# Patient Record
Sex: FEMALE | Race: WHITE | NOT HISPANIC OR LATINO | URBAN - METROPOLITAN AREA
[De-identification: names, ages, dates, MRNs, and addresses within clinical notes are randomized per-mention and may not be internally consistent; named-entity substitution may affect disease eponyms.]

---

## 2017-06-03 ENCOUNTER — INPATIENT (INPATIENT)
Facility: HOSPITAL | Age: 47
LOS: 2 days | Discharge: HOME | End: 2017-06-06
Attending: FAMILY MEDICINE | Admitting: FAMILY MEDICINE
Payer: COMMERCIAL

## 2017-06-03 VITALS — WEIGHT: 149.91 LBS | HEIGHT: 62 IN

## 2017-06-03 DIAGNOSIS — R10.11 RIGHT UPPER QUADRANT PAIN: ICD-10-CM

## 2017-06-03 LAB
ALBUMIN SERPL ELPH-MCNC: 4.1 G/DL — SIGNIFICANT CHANGE UP (ref 3.3–5)
ALP SERPL-CCNC: 108 U/L — SIGNIFICANT CHANGE UP (ref 40–120)
ALT FLD-CCNC: 38 U/L — SIGNIFICANT CHANGE UP (ref 12–78)
ANION GAP SERPL CALC-SCNC: 9 MMOL/L — SIGNIFICANT CHANGE UP (ref 5–17)
AST SERPL-CCNC: 20 U/L — SIGNIFICANT CHANGE UP (ref 15–37)
BASOPHILS # BLD AUTO: 0 K/UL — SIGNIFICANT CHANGE UP (ref 0–0.2)
BASOPHILS NFR BLD AUTO: 0.3 % — SIGNIFICANT CHANGE UP (ref 0–2)
BILIRUB SERPL-MCNC: 0.6 MG/DL — SIGNIFICANT CHANGE UP (ref 0.2–1.2)
BUN SERPL-MCNC: 8 MG/DL — SIGNIFICANT CHANGE UP (ref 7–23)
CALCIUM SERPL-MCNC: 9.6 MG/DL — SIGNIFICANT CHANGE UP (ref 8.5–10.1)
CHLORIDE SERPL-SCNC: 101 MMOL/L — SIGNIFICANT CHANGE UP (ref 96–108)
CK SERPL-CCNC: 96 U/L — SIGNIFICANT CHANGE UP (ref 26–192)
CO2 SERPL-SCNC: 25 MMOL/L — SIGNIFICANT CHANGE UP (ref 22–31)
CREAT SERPL-MCNC: 0.63 MG/DL — SIGNIFICANT CHANGE UP (ref 0.5–1.3)
EOSINOPHIL # BLD AUTO: 0.1 K/UL — SIGNIFICANT CHANGE UP (ref 0–0.5)
EOSINOPHIL NFR BLD AUTO: 0.5 % — SIGNIFICANT CHANGE UP (ref 0–6)
GLUCOSE SERPL-MCNC: 134 MG/DL — HIGH (ref 70–99)
HCG SERPL-ACNC: 1 MIU/ML — SIGNIFICANT CHANGE UP
HCT VFR BLD CALC: 43 % — SIGNIFICANT CHANGE UP (ref 34.5–45)
HGB BLD-MCNC: 15 G/DL — SIGNIFICANT CHANGE UP (ref 11.5–15.5)
LIDOCAIN IGE QN: 90 U/L — SIGNIFICANT CHANGE UP (ref 73–393)
LYMPHOCYTES # BLD AUTO: 1.4 K/UL — SIGNIFICANT CHANGE UP (ref 1–3.3)
LYMPHOCYTES # BLD AUTO: 9.6 % — LOW (ref 13–44)
MCHC RBC-ENTMCNC: 32.2 PG — SIGNIFICANT CHANGE UP (ref 27–34)
MCHC RBC-ENTMCNC: 34.7 GM/DL — SIGNIFICANT CHANGE UP (ref 32–36)
MCV RBC AUTO: 92.8 FL — SIGNIFICANT CHANGE UP (ref 80–100)
MONOCYTES # BLD AUTO: 0.6 K/UL — SIGNIFICANT CHANGE UP (ref 0–0.9)
MONOCYTES NFR BLD AUTO: 4 % — SIGNIFICANT CHANGE UP (ref 2–14)
NEUTROPHILS # BLD AUTO: 12 K/UL — HIGH (ref 1.8–7.4)
NEUTROPHILS NFR BLD AUTO: 85.6 % — HIGH (ref 43–77)
PLATELET # BLD AUTO: 328 K/UL — SIGNIFICANT CHANGE UP (ref 150–400)
POTASSIUM SERPL-MCNC: 3.9 MMOL/L — SIGNIFICANT CHANGE UP (ref 3.5–5.3)
POTASSIUM SERPL-SCNC: 3.9 MMOL/L — SIGNIFICANT CHANGE UP (ref 3.5–5.3)
PROT SERPL-MCNC: 7.4 GM/DL — SIGNIFICANT CHANGE UP (ref 6–8.3)
RBC # BLD: 4.64 M/UL — SIGNIFICANT CHANGE UP (ref 3.8–5.2)
RBC # FLD: 10.7 % — SIGNIFICANT CHANGE UP (ref 10.3–14.5)
SODIUM SERPL-SCNC: 135 MMOL/L — SIGNIFICANT CHANGE UP (ref 135–145)
TROPONIN I SERPL-MCNC: <0.015 NG/ML — SIGNIFICANT CHANGE UP (ref 0.01–0.04)
WBC # BLD: 14.1 K/UL — HIGH (ref 3.8–10.5)
WBC # FLD AUTO: 14.1 K/UL — HIGH (ref 3.8–10.5)

## 2017-06-03 PROCEDURE — 76705 ECHO EXAM OF ABDOMEN: CPT | Mod: 26

## 2017-06-03 PROCEDURE — 93010 ELECTROCARDIOGRAM REPORT: CPT

## 2017-06-03 PROCEDURE — 71010: CPT | Mod: 26

## 2017-06-03 PROCEDURE — 99285 EMERGENCY DEPT VISIT HI MDM: CPT

## 2017-06-03 RX ORDER — ONDANSETRON 8 MG/1
4 TABLET, FILM COATED ORAL EVERY 6 HOURS
Qty: 0 | Refills: 0 | Status: DISCONTINUED | OUTPATIENT
Start: 2017-06-03 | End: 2017-06-06

## 2017-06-03 RX ORDER — SUCRALFATE 1 G
1 TABLET ORAL
Qty: 0 | Refills: 0 | Status: DISCONTINUED | OUTPATIENT
Start: 2017-06-03 | End: 2017-06-05

## 2017-06-03 RX ORDER — PANTOPRAZOLE SODIUM 20 MG/1
8 TABLET, DELAYED RELEASE ORAL
Qty: 80 | Refills: 0 | Status: DISCONTINUED | OUTPATIENT
Start: 2017-06-03 | End: 2017-06-05

## 2017-06-03 RX ORDER — SODIUM CHLORIDE 9 MG/ML
1000 INJECTION INTRAMUSCULAR; INTRAVENOUS; SUBCUTANEOUS ONCE
Qty: 0 | Refills: 0 | Status: COMPLETED | OUTPATIENT
Start: 2017-06-03 | End: 2017-06-03

## 2017-06-03 RX ORDER — MORPHINE SULFATE 50 MG/1
2 CAPSULE, EXTENDED RELEASE ORAL EVERY 6 HOURS
Qty: 0 | Refills: 0 | Status: DISCONTINUED | OUTPATIENT
Start: 2017-06-03 | End: 2017-06-06

## 2017-06-03 RX ORDER — SODIUM CHLORIDE 9 MG/ML
3 INJECTION INTRAMUSCULAR; INTRAVENOUS; SUBCUTANEOUS ONCE
Qty: 0 | Refills: 0 | Status: COMPLETED | OUTPATIENT
Start: 2017-06-03 | End: 2017-06-03

## 2017-06-03 RX ORDER — FAMOTIDINE 10 MG/ML
20 INJECTION INTRAVENOUS ONCE
Qty: 0 | Refills: 0 | Status: COMPLETED | OUTPATIENT
Start: 2017-06-03 | End: 2017-06-03

## 2017-06-03 RX ORDER — LIDOCAINE 4 G/100G
10 CREAM TOPICAL ONCE
Qty: 0 | Refills: 0 | Status: COMPLETED | OUTPATIENT
Start: 2017-06-03 | End: 2017-06-03

## 2017-06-03 RX ORDER — HYDROMORPHONE HYDROCHLORIDE 2 MG/ML
1 INJECTION INTRAMUSCULAR; INTRAVENOUS; SUBCUTANEOUS EVERY 6 HOURS
Qty: 0 | Refills: 0 | Status: DISCONTINUED | OUTPATIENT
Start: 2017-06-03 | End: 2017-06-06

## 2017-06-03 RX ORDER — ENOXAPARIN SODIUM 100 MG/ML
40 INJECTION SUBCUTANEOUS EVERY 24 HOURS
Qty: 0 | Refills: 0 | Status: DISCONTINUED | OUTPATIENT
Start: 2017-06-03 | End: 2017-06-06

## 2017-06-03 RX ORDER — MORPHINE SULFATE 50 MG/1
4 CAPSULE, EXTENDED RELEASE ORAL ONCE
Qty: 0 | Refills: 0 | Status: DISCONTINUED | OUTPATIENT
Start: 2017-06-03 | End: 2017-06-03

## 2017-06-03 RX ORDER — ONDANSETRON 8 MG/1
4 TABLET, FILM COATED ORAL ONCE
Qty: 0 | Refills: 0 | Status: COMPLETED | OUTPATIENT
Start: 2017-06-03 | End: 2017-06-03

## 2017-06-03 RX ADMIN — MORPHINE SULFATE 4 MILLIGRAM(S): 50 CAPSULE, EXTENDED RELEASE ORAL at 08:25

## 2017-06-03 RX ADMIN — Medication 30 MILLILITER(S): at 08:25

## 2017-06-03 RX ADMIN — MORPHINE SULFATE 2 MILLIGRAM(S): 50 CAPSULE, EXTENDED RELEASE ORAL at 21:11

## 2017-06-03 RX ADMIN — ONDANSETRON 4 MILLIGRAM(S): 8 TABLET, FILM COATED ORAL at 14:41

## 2017-06-03 RX ADMIN — Medication 1 GRAM(S): at 23:33

## 2017-06-03 RX ADMIN — PANTOPRAZOLE SODIUM 10 MG/HR: 20 TABLET, DELAYED RELEASE ORAL at 17:12

## 2017-06-03 RX ADMIN — MORPHINE SULFATE 2 MILLIGRAM(S): 50 CAPSULE, EXTENDED RELEASE ORAL at 21:41

## 2017-06-03 RX ADMIN — Medication 1 GRAM(S): at 18:27

## 2017-06-03 RX ADMIN — MORPHINE SULFATE 2 MILLIGRAM(S): 50 CAPSULE, EXTENDED RELEASE ORAL at 14:41

## 2017-06-03 RX ADMIN — SODIUM CHLORIDE 1000 MILLILITER(S): 9 INJECTION INTRAMUSCULAR; INTRAVENOUS; SUBCUTANEOUS at 08:00

## 2017-06-03 RX ADMIN — LIDOCAINE 10 MILLILITER(S): 4 CREAM TOPICAL at 08:25

## 2017-06-03 RX ADMIN — Medication 1 GRAM(S): at 14:41

## 2017-06-03 RX ADMIN — FAMOTIDINE 20 MILLIGRAM(S): 10 INJECTION INTRAVENOUS at 08:25

## 2017-06-03 RX ADMIN — MORPHINE SULFATE 4 MILLIGRAM(S): 50 CAPSULE, EXTENDED RELEASE ORAL at 09:00

## 2017-06-03 RX ADMIN — SODIUM CHLORIDE 3 MILLILITER(S): 9 INJECTION INTRAMUSCULAR; INTRAVENOUS; SUBCUTANEOUS at 08:16

## 2017-06-03 RX ADMIN — ONDANSETRON 4 MILLIGRAM(S): 8 TABLET, FILM COATED ORAL at 08:25

## 2017-06-03 NOTE — CONSULT NOTE ADULT - ASSESSMENT
Epigastric pain     ddx includes ulcer, gastritis, biliary colic  Gallsones/sludge    Protonix drip  Carafate one gram po qid  advise consideration for ugi evaluation with Dr. Levine ' admit the pt and the pt will be considered for ugi eval Monday  ivf  clear liquids  antiemetic prn

## 2017-06-03 NOTE — ED ADULT TRIAGE NOTE - CHIEF COMPLAINT QUOTE
c/o abdominal pain, pt states she possibly has an ulcer, pt scheduled for endoscopy on tuesday, pain worse today.

## 2017-06-03 NOTE — CONSULT NOTE ADULT - SUBJECTIVE AND OBJECTIVE BOX
CC epig pain   HPI: This is a 46 yr old woman presents with epigastric sharp pain for at least 2 weeks  She has no hx of ulcers. She was evaluated at a local urgent care center and had a ct scan abdomen and pelvis and told it was ok.  Referred to Dr. KVNG Levine. Prilosec 20 mg daily switched to aciphex 20 mg daily and carafate one gram qid with some relief until yesterday when the epig pain because worse, more intense. It is non radiating and she cannot say if food aggravated it since she reduced her po intake. She has felt nauseous but did not vomit. No fever or chills  Passing flatus, Tendency in recent days to be constipated. No diarrhea  Denies blood in stool. No melena or brbpr.   Pt denies shortness of breath, palpitations or chest pain  Admits of some heartburn and burps/hiccups.  No prior egd and was planned to have an egd on Tuesday  She does  not wish to go home today because of the epigastric pain       PAST MEDICAL & SURGICAL HISTORY:  No pertinent past medical history  No significant past surgical history      Allergies    No Known Allergies    Intolerances        MEDICATIONS  (STANDING):  Carafate one gram qid  Aciphex 20 mg daily  MEDICATIONS  (PRN):      FAMILY HISTORY:  No pertinent family history in first degree relatives  Mother with esophageal condition (she could not exactly say)    Social History: No tobacco. Occ etoh    REVIEW OF SYSTEMS      General:	No fever or chills    Skin/Breast: No jaundice or rash   		  ENMT: denies sore throat or thrush    Respiratory and Thorax: Denies dyspnea or cough or shortness of breath  	  Cardiovascular: Denies chest pain or palpitations 	    Gastrointestinal: Denies jaundice or pruritis    Genitourinary: Denies dysuria or hematuria	    Musculoskeletal: Denies muscular pain or swelling	    Neurological: Denies confusion or tremor	    Hematology/Lymphatics: Denies easy bruising or bleeding 	    Endocrine:	Denies polyphagia or polyuria    See above hx otherwise neg for any major organ systems    PHYSICAL EXAM:    Vital Signs Last 24 Hrs  T(C): 37.1, Max: 37.1 (06-03 @ 11:17)  T(F): 98.7, Max: 98.7 (06-03 @ 11:17)  HR: 53 (53 - 71)  BP: 154/81 (154/81 - 158/79)  BP(mean): --  RR: 19 (19 - 19)  SpO2: 100% (100% - 100%)    Constitutional: no acute distress    ENMT: NC/AT scl anicteric opm pink no lesions     Neck: supple. No jvd or LN    Respiratory: Clear     Cardiovascular: RRR s1s2     Gastrointestinal: Pos bs , soft ,no hsm or mass, moderate epigastric tenderness, no r/g      Back: No CVA tenderness    Extremities: NO cce     Neurological: Alert and oriented x 3     Skin: No rash or jaundice     Date/Time:06-03 @ 07:41    Albumin: 4.1  ALT/SGPT: 38  Alk Phos: 108  AST/SGOT: 20  Bilirubin Direct: --  Bilirubin Total: 0.6  Ca: 9.6  eGFR : 124  eGFR Non-: 107  Lipase: 90  Amylase: --  INR: --  PTT: --      06-03    135  |  101  |  8   ----------------------------<  134<H>  3.9   |  25  |  0.63    Ca    9.6      03 Jun 2017 07:41    TPro  7.4  /  Alb  4.1  /  TBili  0.6  /  DBili  x   /  AST  20  /  ALT  38  /  AlkPhos  108  06-03                            15.0   14.1  )-----------( 328      ( 03 Jun 2017 07:41 )             43.0       EXAM:  US ABDOMEN LIMITED                            PROCEDURE DATE:  06/03/2017        INTERPRETATION:  US ABDOMEN LIMITED    HISTORY:  Right upper quadrant and midline epigastric pain for 2 weeks    COMPARISON: There are no comparison studies available.    Multiple transverse and longitudinal sonographic images of the right   upper quadrant abdomen were obtained.    LIVER: The liver is normal in size. The liver parenchyma is homogeneous.   No focal lesions are identified.    GALLBLADDER: Small stones and sludge without wall thickening or   pericholecystic fluid. Marin sign is unreliable, the patient was   premedicated.    BILE DUCTS: The common bile duct measures 4 mm.  No intrahepatic ductal   dilatation.    PANCREAS:  The head and proximal body are normal. The distal body and   tail are obscured.    RIGHT KIDNEY: 9.8 cm in length.  No hydronephrosis or shadowing stone.    AORTA AND INFERIOR VENA CAVA: Normal in caliber.        FLUID: No ascites.    IMPRESSION:    Gallbladder stonesand sludge without secondary signs of acute   cholecystitis. No biliary dilatation.

## 2017-06-03 NOTE — H&P ADULT - HISTORY OF PRESENT ILLNESS
47 y/o female with no known past medical history that present with a 2 week history of worsening Abdominal pain.  Patient states that her symptoms started  about 2 weeks ago and describes them as epigastric and right upper quadrant 6/10 soreness and gripping pain that radiated intermittently to the back.  Patient states that she was started on pepcid with interval improvement of her symptoms.  She states that over the last 24 hours she has experienced 10/10 intermittent sharp pain that is constant and worsened by movement or food intake.  She admits to having increased amounts of fried or greasy foods.  Denies fevers, chills, nausea, vomiting, or diarrhea.

## 2017-06-03 NOTE — ED PROVIDER NOTE - MEDICAL DECISION MAKING DETAILS
abd pain, endoscopy scheduled for next week, will lab, line, medicate for pain, us r./o renu, pepcid, viscous lidocaine  and mylantsa

## 2017-06-03 NOTE — H&P ADULT - ASSESSMENT
45 y/o female with no past medical history that present with abdominal pain.    Admit to med/surg  Clear liquid diet  OOB as tolerated

## 2017-06-03 NOTE — H&P ADULT - NSHPSOCIALHISTORY_GEN_ALL_CORE
Current 1/2 ppd cigarette smoker x 12 year  Daily ETOH intake 1-2 beers/wine daily, no history of withdrawal seizures  Denies illicit drug use

## 2017-06-03 NOTE — ED PROVIDER NOTE - OBJECTIVE STATEMENT
47 yo female pw increasing epigastric pain, described as sharp and stabbing ache since last night. no nausea no vomiting. Pt is currently taking protonix and carafate for the last 2 weeks since seeing a gi and having a ct scan. she was told she may have an ulcer and is scheduled for an endoscopy this week. she came here today because she cannot take the pain anymore

## 2017-06-03 NOTE — H&P ADULT - NSHPPHYSICALEXAM_GEN_ALL_CORE
PHYSICAL EXAM:    T(C): 37.1, Max: 37.1 (06-03 @ 11:17)  HR: 53 (53 - 71)  BP: 154/81 (154/81 - 158/79)  RR: 19 (19 - 19)  SpO2: 100% (100% - 100%)

## 2017-06-03 NOTE — ED ADULT NURSE NOTE - OBJECTIVE STATEMENT
Pt c/o of mid epigastric pain x2 weeks. Pt scheduled for endoscopy on6/6/17. Pt states the pain is getting worse. Pt denies any n/v or diarrhea.

## 2017-06-03 NOTE — ED PROVIDER NOTE - PROGRESS NOTE DETAILS
pt with continued abdominal pain unable to eat, spoke with on call gi for Dr. Henderson, Dr. Barrientos, recommend pt tba

## 2017-06-04 LAB
ANION GAP SERPL CALC-SCNC: 3 MMOL/L — LOW (ref 5–17)
BASOPHILS # BLD AUTO: 0 K/UL — SIGNIFICANT CHANGE UP (ref 0–0.2)
BASOPHILS NFR BLD AUTO: 0.5 % — SIGNIFICANT CHANGE UP (ref 0–2)
BUN SERPL-MCNC: 7 MG/DL — SIGNIFICANT CHANGE UP (ref 7–23)
CALCIUM SERPL-MCNC: 9 MG/DL — SIGNIFICANT CHANGE UP (ref 8.5–10.1)
CHLORIDE SERPL-SCNC: 107 MMOL/L — SIGNIFICANT CHANGE UP (ref 96–108)
CO2 SERPL-SCNC: 31 MMOL/L — SIGNIFICANT CHANGE UP (ref 22–31)
CREAT SERPL-MCNC: 0.71 MG/DL — SIGNIFICANT CHANGE UP (ref 0.5–1.3)
EOSINOPHIL # BLD AUTO: 0.3 K/UL — SIGNIFICANT CHANGE UP (ref 0–0.5)
EOSINOPHIL NFR BLD AUTO: 2.9 % — SIGNIFICANT CHANGE UP (ref 0–6)
GLUCOSE SERPL-MCNC: 90 MG/DL — SIGNIFICANT CHANGE UP (ref 70–99)
HCT VFR BLD CALC: 38.6 % — SIGNIFICANT CHANGE UP (ref 34.5–45)
HGB BLD-MCNC: 13.6 G/DL — SIGNIFICANT CHANGE UP (ref 11.5–15.5)
LYMPHOCYTES # BLD AUTO: 2.8 K/UL — SIGNIFICANT CHANGE UP (ref 1–3.3)
LYMPHOCYTES # BLD AUTO: 29.4 % — SIGNIFICANT CHANGE UP (ref 13–44)
MCHC RBC-ENTMCNC: 32.7 PG — SIGNIFICANT CHANGE UP (ref 27–34)
MCHC RBC-ENTMCNC: 35.2 GM/DL — SIGNIFICANT CHANGE UP (ref 32–36)
MCV RBC AUTO: 92.7 FL — SIGNIFICANT CHANGE UP (ref 80–100)
MONOCYTES # BLD AUTO: 0.7 K/UL — SIGNIFICANT CHANGE UP (ref 0–0.9)
MONOCYTES NFR BLD AUTO: 7.5 % — SIGNIFICANT CHANGE UP (ref 2–14)
NEUTROPHILS # BLD AUTO: 5.6 K/UL — SIGNIFICANT CHANGE UP (ref 1.8–7.4)
NEUTROPHILS NFR BLD AUTO: 59.7 % — SIGNIFICANT CHANGE UP (ref 43–77)
PLATELET # BLD AUTO: 275 K/UL — SIGNIFICANT CHANGE UP (ref 150–400)
POTASSIUM SERPL-MCNC: 4.1 MMOL/L — SIGNIFICANT CHANGE UP (ref 3.5–5.3)
POTASSIUM SERPL-SCNC: 4.1 MMOL/L — SIGNIFICANT CHANGE UP (ref 3.5–5.3)
RBC # BLD: 4.16 M/UL — SIGNIFICANT CHANGE UP (ref 3.8–5.2)
RBC # FLD: 10.9 % — SIGNIFICANT CHANGE UP (ref 10.3–14.5)
SODIUM SERPL-SCNC: 141 MMOL/L — SIGNIFICANT CHANGE UP (ref 135–145)
WBC # BLD: 9.4 K/UL — SIGNIFICANT CHANGE UP (ref 3.8–10.5)
WBC # FLD AUTO: 9.4 K/UL — SIGNIFICANT CHANGE UP (ref 3.8–10.5)

## 2017-06-04 RX ADMIN — PANTOPRAZOLE SODIUM 10 MG/HR: 20 TABLET, DELAYED RELEASE ORAL at 13:28

## 2017-06-04 RX ADMIN — MORPHINE SULFATE 2 MILLIGRAM(S): 50 CAPSULE, EXTENDED RELEASE ORAL at 00:21

## 2017-06-04 RX ADMIN — MORPHINE SULFATE 2 MILLIGRAM(S): 50 CAPSULE, EXTENDED RELEASE ORAL at 03:14

## 2017-06-04 RX ADMIN — MORPHINE SULFATE 2 MILLIGRAM(S): 50 CAPSULE, EXTENDED RELEASE ORAL at 21:09

## 2017-06-04 RX ADMIN — MORPHINE SULFATE 2 MILLIGRAM(S): 50 CAPSULE, EXTENDED RELEASE ORAL at 03:44

## 2017-06-04 RX ADMIN — Medication 1 GRAM(S): at 23:05

## 2017-06-04 RX ADMIN — Medication 1 GRAM(S): at 18:10

## 2017-06-04 RX ADMIN — MORPHINE SULFATE 2 MILLIGRAM(S): 50 CAPSULE, EXTENDED RELEASE ORAL at 15:30

## 2017-06-04 RX ADMIN — PANTOPRAZOLE SODIUM 10 MG/HR: 20 TABLET, DELAYED RELEASE ORAL at 02:35

## 2017-06-04 RX ADMIN — Medication 1 GRAM(S): at 13:28

## 2017-06-04 RX ADMIN — MORPHINE SULFATE 2 MILLIGRAM(S): 50 CAPSULE, EXTENDED RELEASE ORAL at 15:09

## 2017-06-04 RX ADMIN — Medication 1 GRAM(S): at 05:07

## 2017-06-04 NOTE — PROGRESS NOTE ADULT - ASSESSMENT
47yo female with ruq/epigastric pain  has sludge  plan for egd tomorrow r/o pud  if negative, likely d/c and would consider outpt ccy

## 2017-06-04 NOTE — PROGRESS NOTE ADULT - SUBJECTIVE AND OBJECTIVE BOX
Patient is a 46y old  Female who presents with a chief complaint of epigastric pain x2 weeks (03 Jun 2017 16:16)      HPI:  pt feeling a little better but still with epigastric discomfort  hungry    PAST MEDICAL & SURGICAL HISTORY:  No pertinent past medical history  No significant past surgical history      MEDICATIONS  (STANDING):  pantoprazole Infusion 8mG/Hr IV Continuous <Continuous>  sucralfate 1Gram(s) Oral four times a day  enoxaparin Injectable 40milliGRAM(s) SubCutaneous every 24 hours    MEDICATIONS  (PRN):  HYDROmorphone  Injectable 1milliGRAM(s) IV Push every 6 hours PRN Severe Pain (7 - 10)  morphine  - Injectable 2milliGRAM(s) IV Push every 6 hours PRN Moderate Pain (4 - 6)  ondansetron Injectable 4milliGRAM(s) IV Push every 6 hours PRN Nausea and/or Vomiting    Allergies  No Known Allergies    REVIEW OF SYSTEMS:  CONSTITUTIONAL: No weakness, fevers or chills  RESPIRATORY: No cough, wheezing, hemoptysis; No shortness of breath  CARDIOVASCULAR: No chest pain or palpitations  GASTROINTESTINAL: epigastric tenderness  All other review of systems is negative unless indicated above.    Vital Signs Last 24 Hrs  T(C): 36.4, Max: 37.2 (06-03 @ 20:43)  T(F): 97.6, Max: 99 (06-03 @ 20:43)  HR: 67 (53 - 67)  BP: 107/71 (107/71 - 154/81)  BP(mean): --  RR: 16 (16 - 19)  SpO2: 98% (96% - 100%)    PHYSICAL EXAM:    Constitutional: NAD, well-developed  Respiratory: CTAB  Cardiovascular: S1 and S2, RRR,  Gastrointestinal: BS+, soft, mildly tender epigastrium  Extremities: No peripheral edema  Psychiatric: Normal mood, normal affect  Skin: No rashes    LABS:                        13.6   9.4   )-----------( 275      ( 04 Jun 2017 06:50 )             38.6     06-04    141  |  107  |  7   ----------------------------<  90  4.1   |  31  |  0.71    Ca    9.0      04 Jun 2017 06:50    TPro  7.4  /  Alb  4.1  /  TBili  0.6  /  DBili  x   /  AST  20  /  ALT  38  /  AlkPhos  108  06-03      LIVER FUNCTIONS - ( 03 Jun 2017 07:41 )  Alb: 4.1 g/dL / Pro: 7.4 gm/dL / ALK PHOS: 108 U/L / ALT: 38 U/L / AST: 20 U/L / GGT: x             RADIOLOGY & ADDITIONAL STUDIES:

## 2017-06-04 NOTE — PROGRESS NOTE ADULT - PROBLEM SELECTOR PLAN 1
cbc demonstrated leucocytosis will follow am value  bmp wnl   GI eval for endoscopy  RUQ sonogram demonstrates biliary sludge  morphine/dilaudid for pain control  zofran 4mg prn n/v

## 2017-06-04 NOTE — PROGRESS NOTE ADULT - SUBJECTIVE AND OBJECTIVE BOX
CC: epigastric pain x2 weeks (03 Jun 2017 16:16)    HPI:  45 y/o female with no known past medical history that present with a 2 week history of worsening Abdominal pain.  Patient states that her symptoms started  about 2 weeks ago and describes them as epigastric and right upper quadrant 6/10 soreness and gripping pain that radiated intermittently to the back.  Patient states that she was started on pepcid with interval improvement of her symptoms.  She states that over the last 24 hours she has experienced 10/10 intermittent sharp pain that is constant and worsened by movement or food intake.  She admits to having increased amounts of fried or greasy foods.  Denies fevers, chills, nausea, vomiting, or diarrhea. (03 Jun 2017 14:12)    INTERVAL HPI/OVERNIGHT EVENTS:    Vital Signs Last 24 Hrs  T(C): 36.4, Max: 37.2 (06-03 @ 20:43)  T(F): 97.5, Max: 99 (06-03 @ 20:43)  HR: 53 (53 - 67)  BP: 103/55 (103/55 - 125/49)  BP(mean): --  RR: 18 (16 - 18)  SpO2: 97% (96% - 98%)  I&O's Detail      CARDIAC MARKERS ( 03 Jun 2017 07:41 )  <0.015 ng/mL / x     / 96 U/L / x     / x                                13.6   9.4   )-----------( 275      ( 04 Jun 2017 06:50 )             38.6     04 Jun 2017 06:50    141    |  107    |  7      ----------------------------<  90     4.1     |  31     |  0.71     Ca    9.0        04 Jun 2017 06:50    TPro  7.4    /  Alb  4.1    /  TBili  0.6    /  DBili  x      /  AST  20     /  ALT  38     /  AlkPhos  108    03 Jun 2017 07:41      CAPILLARY BLOOD GLUCOSE    LIVER FUNCTIONS - ( 03 Jun 2017 07:41 )  Alb: 4.1 g/dL / Pro: 7.4 gm/dL / ALK PHOS: 108 U/L / ALT: 38 U/L / AST: 20 U/L / GGT: x               MEDICATIONS  (STANDING):  pantoprazole Infusion 8mG/Hr IV Continuous <Continuous>  sucralfate 1Gram(s) Oral four times a day  enoxaparin Injectable 40milliGRAM(s) SubCutaneous every 24 hours    MEDICATIONS  (PRN):  HYDROmorphone  Injectable 1milliGRAM(s) IV Push every 6 hours PRN Severe Pain (7 - 10)  morphine  - Injectable 2milliGRAM(s) IV Push every 6 hours PRN Moderate Pain (4 - 6)  ondansetron Injectable 4milliGRAM(s) IV Push every 6 hours PRN Nausea and/or Vomiting      RADIOLOGY & ADDITIONAL TESTS: CC: epigastric pain x2 weeks (03 Jun 2017 16:16)    HPI:  47 y/o female with no known past medical history that present with a 2 week history of worsening Abdominal pain.  Patient states that her symptoms started  about 2 weeks ago and describes them as epigastric and right upper quadrant 6/10 soreness and gripping pain that radiated intermittently to the back.  Patient states that she was started on pepcid with interval improvement of her symptoms.  She states that over the last 24 hours she has experienced 10/10 intermittent sharp pain that is constant and worsened by movement or food intake.  She admits to having increased amounts of fried or greasy foods.  Denies fevers, chills, nausea, vomiting, or diarrhea. (03 Jun 2017 14:12)    INTERVAL HPI/ OVERNIGHT EVENTS: Pt was seen and examined overall pain is better  felt ok in am and had some food, but now has abd pain again, less intense then at home. No N/V. Pt planned for EGD in am. Rest of results and POC discussed.     Vital Signs Last 24 Hrs  T(C): 36.4, Max: 37.2 (06-03 @ 20:43)  T(F): 97.5, Max: 99 (06-03 @ 20:43)  HR: 53 (53 - 67)  BP: 103/55 (103/55 - 125/49)  BP(mean): --  RR: 18 (16 - 18)  SpO2: 97% (96% - 98%)    CARDIAC MARKERS ( 03 Jun 2017 07:41 )  <0.015 ng/mL / x     / 96 U/L / x     / x                                13.6   9.4   )-----------( 275      ( 04 Jun 2017 06:50 )             38.6     04 Jun 2017 06:50    141    |  107    |  7      ----------------------------<  90     4.1     |  31     |  0.71     Ca    9.0        04 Jun 2017 06:50    TPro  7.4    /  Alb  4.1    /  TBili  0.6    /  DBili  x      /  AST  20     /  ALT  38     /  AlkPhos  108    03 Jun 2017 07:41      LIVER FUNCTIONS - ( 03 Jun 2017 07:41 )  Alb: 4.1 g/dL / Pro: 7.4 gm/dL / ALK PHOS: 108 U/L / ALT: 38 U/L / AST: 20 U/L / GGT: x               MEDICATIONS  (STANDING):  pantoprazole Infusion 8mG/Hr IV Continuous <Continuous>  sucralfate 1Gram(s) Oral four times a day  enoxaparin Injectable 40milliGRAM(s) SubCutaneous every 24 hours    MEDICATIONS  (PRN):  HYDROmorphone  Injectable 1milliGRAM(s) IV Push every 6 hours PRN Severe Pain (7 - 10)  morphine  - Injectable 2milliGRAM(s) IV Push every 6 hours PRN Moderate Pain (4 - 6)  ondansetron Injectable 4milliGRAM(s) IV Push every 6 hours PRN Nausea and/or Vomiting      RADIOLOGY & ADDITIONAL TESTS:    EXAM:  US ABDOMEN LIMITED                            PROCEDURE DATE:  06/03/2017        INTERPRETATION:  US ABDOMEN LIMITED    HISTORY:  Right upper quadrant and midline epigastric pain for 2 weeks    COMPARISON: There are no comparison studies available.    Multiple transverse and longitudinal sonographic images of the right   upper quadrant abdomen were obtained.    LIVER: The liver is normal in size. The liver parenchyma is homogeneous.   No focal lesions are identified.    GALLBLADDER: Small stones and sludge without wall thickening or   pericholecystic fluid. Marin sign is unreliable, the patient was   premedicated.    BILE DUCTS: The common bile duct measures 4 mm.  No intrahepatic ductal   dilatation.    PANCREAS:  The head and proximal body are normal. The distal body and   tail are obscured.    RIGHT KIDNEY: 9.8 cm in length.  No hydronephrosis or shadowing stone.    AORTA AND INFERIOR VENA CAVA: Normal in caliber.        FLUID: No ascites.    IMPRESSION:    Gallbladder stones and sludge without secondary signs of acute   cholecystitis. No biliary dilatation.

## 2017-06-04 NOTE — PROGRESS NOTE ADULT - ASSESSMENT
47 y/o female with no past medical history that present with abdominal pain.    Admit to med/surg  Clear liquid diet  OOB as tolerated 45 y/o female with no past medical history that present with abdominal pain.    Abdominal pain 2/2 GAstritis vs PUD?   RUQ sono: + GB sludge/stones, no acute signs  Leukocytosis resolved, no fever, LFTs WNL  C/w PPI IV , sucralfate   Zofran PRN   Pain meds   GI eval appreciated, Planned for Upper Endoscopy in am   DVT PPxs

## 2017-06-05 LAB — HCG UR QL: NEGATIVE — SIGNIFICANT CHANGE UP

## 2017-06-05 PROCEDURE — 88312 SPECIAL STAINS GROUP 1: CPT | Mod: 26

## 2017-06-05 PROCEDURE — 88305 TISSUE EXAM BY PATHOLOGIST: CPT | Mod: 26

## 2017-06-05 RX ORDER — PANTOPRAZOLE SODIUM 20 MG/1
40 TABLET, DELAYED RELEASE ORAL
Qty: 0 | Refills: 0 | Status: DISCONTINUED | OUTPATIENT
Start: 2017-06-05 | End: 2017-06-06

## 2017-06-05 RX ORDER — SODIUM CHLORIDE 9 MG/ML
1000 INJECTION, SOLUTION INTRAVENOUS
Qty: 0 | Refills: 0 | Status: DISCONTINUED | OUTPATIENT
Start: 2017-06-05 | End: 2017-06-06

## 2017-06-05 RX ORDER — SUCRALFATE 1 G
1 TABLET ORAL
Qty: 0 | Refills: 0 | Status: DISCONTINUED | OUTPATIENT
Start: 2017-06-05 | End: 2017-06-06

## 2017-06-05 RX ADMIN — SODIUM CHLORIDE 75 MILLILITER(S): 9 INJECTION, SOLUTION INTRAVENOUS at 11:09

## 2017-06-05 RX ADMIN — Medication 1 GRAM(S): at 18:32

## 2017-06-05 RX ADMIN — MORPHINE SULFATE 2 MILLIGRAM(S): 50 CAPSULE, EXTENDED RELEASE ORAL at 18:31

## 2017-06-05 RX ADMIN — MORPHINE SULFATE 2 MILLIGRAM(S): 50 CAPSULE, EXTENDED RELEASE ORAL at 11:23

## 2017-06-05 RX ADMIN — MORPHINE SULFATE 2 MILLIGRAM(S): 50 CAPSULE, EXTENDED RELEASE ORAL at 11:08

## 2017-06-05 RX ADMIN — MORPHINE SULFATE 2 MILLIGRAM(S): 50 CAPSULE, EXTENDED RELEASE ORAL at 05:14

## 2017-06-05 RX ADMIN — ONDANSETRON 4 MILLIGRAM(S): 8 TABLET, FILM COATED ORAL at 11:16

## 2017-06-05 RX ADMIN — MORPHINE SULFATE 2 MILLIGRAM(S): 50 CAPSULE, EXTENDED RELEASE ORAL at 05:53

## 2017-06-05 RX ADMIN — PANTOPRAZOLE SODIUM 10 MG/HR: 20 TABLET, DELAYED RELEASE ORAL at 01:54

## 2017-06-05 NOTE — PROGRESS NOTE ADULT - ASSESSMENT
47 y/o female with no past medical history that present with abdominal pain.    Abdominal pain 2/2 GAstritis vs PUD vs GB Dz   RUQ sono: + GB sludge/stones, no acute signs  Leukocytosis resolved, no fever, LFTs WNL  On  PPI IV , sucralfate   Zofran PRN   Pain meds   Planned for Upper Endoscopy today   D/w DR Greenfield if, neg will need Gallbladder evaluation     DVT PPxs

## 2017-06-05 NOTE — PROGRESS NOTE ADULT - SUBJECTIVE AND OBJECTIVE BOX
CC: epigastric pain x2 weeks (03 Jun 2017 16:16)    HPI:  47 y/o female with no known past medical history that present with a 2 week history of worsening Abdominal pain.  Patient states that her symptoms started  about 2 weeks ago and describes them as epigastric and right upper quadrant 6/10 soreness and gripping pain that radiated intermittently to the back.  Patient states that she was started on pepcid with interval improvement of her symptoms.  She states that over the last 24 hours she has experienced 10/10 intermittent sharp pain that is constant and worsened by movement or food intake.  She admits to having increased amounts of fried or greasy foods.  Denies fevers, chills, nausea, vomiting, or diarrhea. (03 Jun 2017 14:12)    INTERVAL HPI/ OVERNIGHT EVENTS: Pt was seen and examined awaiting for EGD today, had abd pain this am, but not as severe as before. No N/V     Vital Signs Last 24 Hrs  T(C): 37, Max: 37 (06-05 @ 17:31)  T(F): 98.6, Max: 98.6 (06-05 @ 17:31)  HR: 60 (55 - 60)  BP: 111/42 (95/50 - 111/42)  BP(mean): --  RR: 18 (18 - 18)  SpO2: 99% (98% - 99%)      LABS:      CARDIAC MARKERS ( 03 Jun 2017 07:41 )  <0.015 ng/mL / x     / 96 U/L / x     / x                              13.6   9.4   )-----------( 275      ( 04 Jun 2017 06:50 )             38.6     06-04    141  |  107  |  7   ----------------------------<  90  4.1   |  31  |  0.71    Ca    9.0      04 Jun 2017 06:50  FUNCTIONS - ( 03 Jun 2017 07:41 )  Alb: 4.1 g/dL / Pro: 7.4 gm/dL / ALK PHOS: 108 U/L / ALT: 38 U/L / AST: 20 U/L / GGT: x               MEDICATIONS  (STANDING):  enoxaparin Injectable 40milliGRAM(s) SubCutaneous every 24 hours  dextrose 5% + sodium chloride 0.45%. 1000milliLiter(s) IV Continuous <Continuous>  sucralfate suspension 1Gram(s) Oral four times a day  pantoprazole    Tablet 40milliGRAM(s) Oral two times a day before meals    MEDICATIONS  (PRN):  HYDROmorphone  Injectable 1milliGRAM(s) IV Push every 6 hours PRN Severe Pain (7 - 10)  morphine  - Injectable 2milliGRAM(s) IV Push every 6 hours PRN Moderate Pain (4 - 6)  ondansetron Injectable 4milliGRAM(s) IV Push every 6 hours PRN Nausea and/or Vomiting      RADIOLOGY & ADDITIONAL TESTS:    EXAM:  US ABDOMEN LIMITED                            PROCEDURE DATE:  06/03/2017        INTERPRETATION:  US ABDOMEN LIMITED    HISTORY:  Right upper quadrant and midline epigastric pain for 2 weeks    COMPARISON: There are no comparison studies available.    Multiple transverse and longitudinal sonographic images of the right   upper quadrant abdomen were obtained.    LIVER: The liver is normal in size. The liver parenchyma is homogeneous.   No focal lesions are identified.    GALLBLADDER: Small stones and sludge without wall thickening or   pericholecystic fluid. Marin sign is unreliable, the patient was   premedicated.    BILE DUCTS: The common bile duct measures 4 mm.  No intrahepatic ductal   dilatation.    PANCREAS:  The head and proximal body are normal. The distal body and   tail are obscured.    RIGHT KIDNEY: 9.8 cm in length.  No hydronephrosis or shadowing stone.    AORTA AND INFERIOR VENA CAVA: Normal in caliber.        FLUID: No ascites.    IMPRESSION:    Gallbladder stones and sludge without secondary signs of acute   cholecystitis. No biliary dilatation.

## 2017-06-05 NOTE — PROGRESS NOTE ADULT - RS GEN PE MLT RESP DETAILS PC
clear to auscultation bilaterally/breath sounds equal
breath sounds equal/clear to auscultation bilaterally

## 2017-06-06 VITALS
OXYGEN SATURATION: 99 % | DIASTOLIC BLOOD PRESSURE: 59 MMHG | HEART RATE: 57 BPM | RESPIRATION RATE: 18 BRPM | TEMPERATURE: 99 F | SYSTOLIC BLOOD PRESSURE: 112 MMHG

## 2017-06-06 LAB
ANION GAP SERPL CALC-SCNC: 5 MMOL/L — SIGNIFICANT CHANGE UP (ref 5–17)
BUN SERPL-MCNC: 6 MG/DL — LOW (ref 7–23)
CALCIUM SERPL-MCNC: 8.5 MG/DL — SIGNIFICANT CHANGE UP (ref 8.5–10.1)
CHLORIDE SERPL-SCNC: 106 MMOL/L — SIGNIFICANT CHANGE UP (ref 96–108)
CO2 SERPL-SCNC: 29 MMOL/L — SIGNIFICANT CHANGE UP (ref 22–31)
CREAT SERPL-MCNC: 0.67 MG/DL — SIGNIFICANT CHANGE UP (ref 0.5–1.3)
GLUCOSE SERPL-MCNC: 112 MG/DL — HIGH (ref 70–99)
HCT VFR BLD CALC: 34 % — LOW (ref 34.5–45)
HGB BLD-MCNC: 11.9 G/DL — SIGNIFICANT CHANGE UP (ref 11.5–15.5)
MCHC RBC-ENTMCNC: 32.1 PG — SIGNIFICANT CHANGE UP (ref 27–34)
MCHC RBC-ENTMCNC: 34.9 GM/DL — SIGNIFICANT CHANGE UP (ref 32–36)
MCV RBC AUTO: 92 FL — SIGNIFICANT CHANGE UP (ref 80–100)
PLATELET # BLD AUTO: 229 K/UL — SIGNIFICANT CHANGE UP (ref 150–400)
POTASSIUM SERPL-MCNC: 3.6 MMOL/L — SIGNIFICANT CHANGE UP (ref 3.5–5.3)
POTASSIUM SERPL-SCNC: 3.6 MMOL/L — SIGNIFICANT CHANGE UP (ref 3.5–5.3)
RBC # BLD: 3.7 M/UL — LOW (ref 3.8–5.2)
RBC # FLD: 10.6 % — SIGNIFICANT CHANGE UP (ref 10.3–14.5)
SODIUM SERPL-SCNC: 140 MMOL/L — SIGNIFICANT CHANGE UP (ref 135–145)
WBC # BLD: 11.6 K/UL — HIGH (ref 3.8–10.5)
WBC # FLD AUTO: 11.6 K/UL — HIGH (ref 3.8–10.5)

## 2017-06-06 RX ORDER — SUCRALFATE 1 G
10 TABLET ORAL
Qty: 280 | Refills: 0 | OUTPATIENT
Start: 2017-06-06 | End: 2017-06-13

## 2017-06-06 RX ORDER — PANTOPRAZOLE SODIUM 20 MG/1
1 TABLET, DELAYED RELEASE ORAL
Qty: 28 | Refills: 0 | OUTPATIENT
Start: 2017-06-06 | End: 2017-06-20

## 2017-06-06 RX ORDER — TRAMADOL HYDROCHLORIDE 50 MG/1
25 TABLET ORAL EVERY 6 HOURS
Qty: 0 | Refills: 0 | Status: DISCONTINUED | OUTPATIENT
Start: 2017-06-06 | End: 2017-06-06

## 2017-06-06 RX ORDER — TRAMADOL HYDROCHLORIDE 50 MG/1
50 TABLET ORAL EVERY 6 HOURS
Qty: 0 | Refills: 0 | Status: DISCONTINUED | OUTPATIENT
Start: 2017-06-06 | End: 2017-06-06

## 2017-06-06 RX ORDER — TRAMADOL HYDROCHLORIDE 50 MG/1
1 TABLET ORAL
Qty: 20 | Refills: 0 | OUTPATIENT
Start: 2017-06-06 | End: 2017-06-11

## 2017-06-06 RX ADMIN — Medication 1 GRAM(S): at 10:53

## 2017-06-06 RX ADMIN — MORPHINE SULFATE 2 MILLIGRAM(S): 50 CAPSULE, EXTENDED RELEASE ORAL at 00:33

## 2017-06-06 RX ADMIN — SODIUM CHLORIDE 75 MILLILITER(S): 9 INJECTION, SOLUTION INTRAVENOUS at 01:46

## 2017-06-06 RX ADMIN — Medication 1 GRAM(S): at 00:32

## 2017-06-06 RX ADMIN — PANTOPRAZOLE SODIUM 40 MILLIGRAM(S): 20 TABLET, DELAYED RELEASE ORAL at 06:18

## 2017-06-06 RX ADMIN — Medication 1 GRAM(S): at 17:09

## 2017-06-06 RX ADMIN — PANTOPRAZOLE SODIUM 40 MILLIGRAM(S): 20 TABLET, DELAYED RELEASE ORAL at 17:09

## 2017-06-06 RX ADMIN — MORPHINE SULFATE 2 MILLIGRAM(S): 50 CAPSULE, EXTENDED RELEASE ORAL at 06:47

## 2017-06-06 RX ADMIN — MORPHINE SULFATE 2 MILLIGRAM(S): 50 CAPSULE, EXTENDED RELEASE ORAL at 00:45

## 2017-06-06 RX ADMIN — Medication 1 GRAM(S): at 06:18

## 2017-06-06 NOTE — DISCHARGE NOTE ADULT - MEDICATION SUMMARY - MEDICATIONS TO TAKE
I will START or STAY ON the medications listed below when I get home from the hospital:    traMADol 50 mg oral tablet  -- 1 tab(s) by mouth every 6 to 8 hours, As Needed -for moderate pain MDD:200mg  -- Indication: For pain    sucralfate 1 g/10 mL oral suspension  -- 10 milliliter(s) by mouth 4 times a day  -- Indication: For Duodenitis/Gastritis    pantoprazole 40 mg oral delayed release tablet  -- 1 tab(s) by mouth 2 times a day (before meals)  -- Indication: For Duodenitis/Gastritis

## 2017-06-06 NOTE — DISCHARGE NOTE ADULT - CARE PLAN
Principal Discharge DX:	Abdominal pain  Goal:	resolve  Instructions for follow-up, activity and diet:	c/w meds, diet. f/u with GI

## 2017-06-06 NOTE — DISCHARGE NOTE ADULT - OTHER SIGNIFICANT FINDINGS
11.9   11.6  )-----------( 229      ( 06 Jun 2017 06:46 )             34.0     06-06    140  |  106  |  6<L>  ----------------------------<  112<H>  3.6   |  29  |  0.67    Ca    8.5      06 Jun 2017 06:46

## 2017-06-06 NOTE — DISCHARGE NOTE ADULT - HOSPITAL COURSE
45 y/o female with no known past medical history that present with a 2 week history of worsening Abdominal pain.  Patient states that her symptoms started  about 2 weeks ago and describes them as epigastric and right upper quadrant 6/10 soreness and gripping pain that radiated intermittently to the back.  Patient states that she was started on pepcid with interval improvement of her symptoms.   Day before admission she was experiencing  10/10 intermittent sharp pain that was constant and worsened by movement or food intake.  No fevers, mo V/V or diarrhea. Pt  was admitted and CT abd/pelvis was dome which showed only GB sludge and LFTs wnl, yesterday Pt had EGD. Today  Pt reports that overall abd pain is better but still there about 4-5/10. Pt tolerates diet. D/c planning discussed, she will f/u with DR Greenfield  for further work up.     PHYSICAL EXAM:  General: Well developed; well nourished; in no acute distress  Eyes: PERRLA, EOMI; conjunctiva and sclera clear  Head: Normocephalic; atraumatic  ENMT: No nasal discharge; airway clear  Neck: Supple; non tender; no masses  Respiratory: No wheezes, rales or rhonchi  Cardiovascular: Regular rate and rhythm. S1 and S2 Normal; No murmurs,   Gastrointestinal: Softnon-distended, + epigastric mild tenderness, no guarding, Normal bowel sounds  Genitourinary: No costovertebral angle tenderness  Extremities: Normal range of motion, No edema  Vascular: Peripheral pulses palpable 2+ bilaterally  Neurological: Alert and oriented x4, non focal   Skin: Warm and dry. No acute rash  Musculoskeletal: Normal gait, tone  Psychiatric: Cooperative and appropriate   A/p :   Abdominal pain  unclear ethiology  EGD done yesterday showed erosive gastritis and mild duodenitis. Hiatal hernia. Bx and H/Pylori testing sent   Pt is tolerating diet  Pain better on PPI and sucralfate, will continue  RX for ultram given   RUQ sono: + GB sludge/stones  Pt will f/u with DR Greenfield for further work up and results next week in the office      Dispo: Stable to d/c home today

## 2017-06-06 NOTE — DISCHARGE NOTE ADULT - PATIENT PORTAL LINK FT
“You can access the [employer] Patient Portal, offered by [facility], by registering with the following website: [email or web address]”

## 2017-06-07 LAB — SURGICAL PATHOLOGY FINAL REPORT - CH: SIGNIFICANT CHANGE UP

## 2017-06-09 DIAGNOSIS — K80.20 CALCULUS OF GALLBLADDER WITHOUT CHOLECYSTITIS WITHOUT OBSTRUCTION: ICD-10-CM

## 2017-06-09 DIAGNOSIS — K29.70 GASTRITIS, UNSPECIFIED, WITHOUT BLEEDING: ICD-10-CM

## 2017-06-09 DIAGNOSIS — D72.829 ELEVATED WHITE BLOOD CELL COUNT, UNSPECIFIED: ICD-10-CM

## 2017-06-09 DIAGNOSIS — R10.11 RIGHT UPPER QUADRANT PAIN: ICD-10-CM

## 2017-06-09 DIAGNOSIS — K29.80 DUODENITIS WITHOUT BLEEDING: ICD-10-CM

## 2017-06-09 DIAGNOSIS — K44.9 DIAPHRAGMATIC HERNIA WITHOUT OBSTRUCTION OR GANGRENE: ICD-10-CM

## 2021-11-27 NOTE — DISCHARGE NOTE ADULT - NS MD DC FALL RISK RISK
Chief complaint:   Chief Complaint   Patient presents with   • Sore Throat     started last night states feels her throat is on fire, pt is pregnant       Vitals:  Visit Vitals  /68   Pulse 89   Temp 97 °F (36.1 °C)   Wt 101.2 kg (223 lb)   LMP 08/08/2021 (Exact Date)   SpO2 99%   BMI 38.28 kg/m²       HISTORY OF PRESENT ILLNESS     Sore Throat  This is a new problem. The current episode started yesterday. The problem occurs constantly. The problem has been gradually worsening. Associated symptoms include a sore throat. Pertinent negatives include no chills, congestion, coughing, fatigue, fever, nausea, neck pain, swollen glands, vomiting or weakness. Nothing aggravates the symptoms. She has tried nothing for the symptoms. The treatment provided no relief.   Patient is 4 months pregnant    Other significant problems:  Patient Active Problem List    Diagnosis Date Noted   • Supervision of high risk pregnancy, antepartum 09/22/2021     Priority: Low   • Type 2 diabetes mellitus, without long-term current use of insulin (CMS/[facility]) 05/03/2019     Priority: Low   • Body mass index (BMI) 45.0-49.9, adult 12/11/2018     Priority: Low   • Morbidly obese (CMS/[facility]) 12/11/2018     Priority: Low   • PCOS (polycystic ovarian syndrome) 03/22/2018     Priority: Low       PAST MEDICAL, FAMILY AND SOCIAL HISTORY     Medications:  Current Outpatient Medications   Medication   • Cholecalciferol (VITAMIN D3 PO)   • ASPIRIN 81 PO   • Prenatal Vit-DSS-Fe Cbn-FA (PRENATAL AD PO)   • COVID-19 mRNA, Pfizer, 30 MCG/0.3ML vaccine   • naproxen (NAPROSYN) 500 MG tablet   • HYDROcodone-acetaminophen (NORCO) 5-325 MG per tablet     No current facility-administered medications for this visit.       Allergies:  ALLERGIES:  No Known Allergies    Past Medical  History/Surgeries:  Past Medical History:   Diagnosis Date   • COVID-19 08/05/2020   • Gastroesophageal reflux disease    • Menorrhagia    • Multiple body piercings    • Ovarian cyst  2019   • PCOS (polycystic ovarian syndrome)    • Type 2 diabetes mellitus (CMS/HCC)     Resolved with weight loss       Past Surgical History:   Procedure Laterality Date   • Appendectomy  2019   • Endometrial biopsy     • Esophagogastroduodenoscopy transoral flex w/bx single or mult  2019    Fil[facility]erto   • Knee surgery Left 1992    \"Abnormality d/t chicken pox\",    • Laparoscopy gastrc restrictive longitudinal  2019    Filiberto   • Oophorectomy Right 2019   • Salpingectomy Right     partial salpingectomy       Family History:  Family History   Problem Relation Age of Onset   • Cancer Paternal Aunt         Breast   • Hypertension Father    • Cancer Father         rectal   • Substance Abuse Paternal Grandfather        Social History:  Social History     Tobacco Use   • Smoking status: Former Smoker     Packs/day: 0.25     Years: 9.00     Pack years: 2.25     Types: Cigarettes     Quit date: 2019     Years since quittin.8   • Smokeless tobacco: Never Used   Substance Use Topics   • Alcohol use: Not Currently     Comment: 5 drinks per month       REVIEW OF SYSTEMS     Review of Systems   Constitutional: Negative for chills, fatigue and fever.   HENT: Positive for sore throat. Negative for congestion.    Respiratory: Negative for cough.    Gastrointestinal: Negative for nausea and vomiting.   Musculoskeletal: Negative for neck pain.   Neurological: Negative for weakness.       PHYSICAL EXAM     Physical Exam  Vitals and nursing note reviewed.   Constitutional:       General: She is not in acute distress.     Appearance: Normal appearance. She is not ill-appearing, toxic-appearing or diaphoretic.   HENT:      Right Ear: Tympanic membrane, ear canal and external ear normal.      Left Ear: Tympanic membrane, ear canal and external ear normal.      Nose: Congestion present.      Mouth/Throat:      Mouth: Mucous membranes are moist.      Pharynx: Posterior oropharyngeal erythema  present.   Cardiovascular:      Rate and Rhythm: Normal rate and regular rhythm.      Heart sounds: Normal heart sounds.   Musculoskeletal:      Cervical back: Normal range of motion and neck supple. No rigidity.   Lymphadenopathy:      Cervical: No cervical adenopathy.   Neurological:      Mental Status: She is alert.       COVID antigen test is negative.  Moderate test deferred patient declined  ASSESSMENT/PLAN     MDM URGENT CARE COURSE:  Patient was advised to contact his primary care physician patient also need to contact [employer] as she works in the was seen urgent care.  Antigen test is negative.    Darby was seen today for sore throat.    Diagnoses and all orders for this visit:    Sore throat  -     POCT RAPID STREP A  -     COVID DIAGNOSTIC TEST  -     STREPTOCOCCUS GROUP A (STREPTOCOCCUS PYOGENES), BACTERIAL CULTURE               Plan:  Patient advised to contact [employer] [facility] before returning to work  Recommend plenty of fluids, hydration orally. . Rest. Avoid undue exertion. Watch for any increasing/worsening symptoms.  Recommendation to follow with the primary care physician . If the symptoms continues or  worsen contact primary care physician or  recommend patient go to the emergency room.     Dr. Benny Tilley M.D.  [facility]  [id number] [address] [place] [address]  Telephone:  [phone]         For information on Fall & Injury Prevention, visit [email or web address]

## 2023-04-11 NOTE — DISCHARGE NOTE ADULT - CARE PROVIDER_API CALL
Is the patient currently in the state of [place]? YES    Visit mode:VIDEO    If the visit is dropped, the patient can be reconnected by: VIDEO VISIT: Text to cell phone: [phone]    Will anyone else be joining the visit? NO      How would you like to obtain your AVS? MyChart    Are changes needed to the allergy or medication list? NO    Reason for visit:       
Dimitrios Greenfield), Gastroenterology; Internal Medicine  [address] [place] [address]  Phone: [phone]  Fax: [phone]

## 2025-07-02 ENCOUNTER — OFFICE (OUTPATIENT)
Dept: URBAN - METROPOLITAN AREA CLINIC 102 | Facility: CLINIC | Age: 55
Setting detail: OPHTHALMOLOGY
End: 2025-07-02
Payer: COMMERCIAL

## 2025-07-02 DIAGNOSIS — H40.033: ICD-10-CM

## 2025-07-02 PROCEDURE — 92083 EXTENDED VISUAL FIELD XM: CPT | Performed by: OPHTHALMOLOGY

## 2025-07-02 PROCEDURE — 99203 OFFICE O/P NEW LOW 30 MIN: CPT | Performed by: OPHTHALMOLOGY

## 2025-07-02 PROCEDURE — 92133 CPTRZD OPH DX IMG PST SGM ON: CPT | Performed by: OPHTHALMOLOGY

## 2025-07-02 ASSESSMENT — VISUAL ACUITY
OD_BCVA: 20/20-1
OS_BCVA: 20/20-2

## 2025-07-02 ASSESSMENT — REFRACTION_AUTOREFRACTION
OD_CYLINDER: -0.75
OS_SPHERE: +2.25
OD_AXIS: 098
OS_CYLINDER: -0.75
OS_AXIS: 100
OD_SPHERE: +2.25

## 2025-07-02 ASSESSMENT — REFRACTION_CURRENTRX
OS_CYLINDER: -0.25
OS_VPRISM_DIRECTION: PROGS
OD_SPHERE: +1.50
OS_OVR_VA: 20/
OS_AXIS: 112
OD_AXIS: 095
OS_ADD: +2.25
OD_VPRISM_DIRECTION: PROGS
OD_OVR_VA: 20/
OD_CYLINDER: -0.50
OS_SPHERE: +1.50
OD_ADD: +2.25

## 2025-07-02 ASSESSMENT — KERATOMETRY
OD_AXISANGLE_DEGREES: 047
OS_K1POWER_DIOPTERS: 43.75
OD_K1POWER_DIOPTERS: 44.25
OS_AXISANGLE_DEGREES: 043
OS_K2POWER_DIOPTERS: 44.00
OD_K2POWER_DIOPTERS: 45.00

## 2025-07-02 ASSESSMENT — TONOMETRY: OS_IOP_MMHG: 21

## 2025-07-02 ASSESSMENT — CONFRONTATIONAL VISUAL FIELD TEST (CVF)
OS_FINDINGS: FULL
OD_FINDINGS: FULL